# Patient Record
Sex: FEMALE | Race: WHITE | NOT HISPANIC OR LATINO | URBAN - METROPOLITAN AREA
[De-identification: names, ages, dates, MRNs, and addresses within clinical notes are randomized per-mention and may not be internally consistent; named-entity substitution may affect disease eponyms.]

---

## 2021-09-27 ENCOUNTER — OFFICE VISIT (OUTPATIENT)
Dept: OBSTETRICS AND GYNECOLOGY | Facility: CLINIC | Age: 21
End: 2021-09-27

## 2021-09-27 VITALS
HEIGHT: 72 IN | DIASTOLIC BLOOD PRESSURE: 74 MMHG | WEIGHT: 289 LBS | BODY MASS INDEX: 39.14 KG/M2 | SYSTOLIC BLOOD PRESSURE: 115 MMHG

## 2021-09-27 DIAGNOSIS — F31.9 BIPOLAR 1 DISORDER (HCC): ICD-10-CM

## 2021-09-27 DIAGNOSIS — Z11.3 SCREENING EXAMINATION FOR STD (SEXUALLY TRANSMITTED DISEASE): Primary | ICD-10-CM

## 2021-09-27 DIAGNOSIS — Z30.09 GENERAL COUNSELING AND ADVICE FOR CONTRACEPTIVE MANAGEMENT: ICD-10-CM

## 2021-09-27 DIAGNOSIS — B00.9 HERPES: ICD-10-CM

## 2021-09-27 PROCEDURE — 99203 OFFICE O/P NEW LOW 30 MIN: CPT | Performed by: OBSTETRICS & GYNECOLOGY

## 2021-09-27 RX ORDER — DROSPIRENONE 4 MG/1
1 TABLET, FILM COATED ORAL DAILY
Qty: 28 TABLET | Refills: 11 | Status: SHIPPED | OUTPATIENT
Start: 2021-09-27 | End: 2022-04-13 | Stop reason: SDUPTHER

## 2021-09-27 RX ORDER — VENLAFAXINE 25 MG/1
25 TABLET ORAL DAILY
COMMUNITY

## 2021-09-27 RX ORDER — ACYCLOVIR 400 MG/1
400 TABLET ORAL
COMMUNITY
End: 2021-09-27

## 2021-09-27 RX ORDER — VALACYCLOVIR HYDROCHLORIDE 500 MG/1
500 TABLET, FILM COATED ORAL DAILY
Qty: 30 TABLET | Refills: 11
Start: 2021-09-27 | End: 2022-09-27

## 2021-09-29 LAB
C TRACH RRNA SPEC QL NAA+PROBE: NEGATIVE
N GONORRHOEA RRNA SPEC QL NAA+PROBE: NEGATIVE

## 2022-03-14 ENCOUNTER — TELEPHONE (OUTPATIENT)
Dept: OBSTETRICS AND GYNECOLOGY | Facility: CLINIC | Age: 22
End: 2022-03-14

## 2022-04-13 ENCOUNTER — OFFICE VISIT (OUTPATIENT)
Dept: OBSTETRICS AND GYNECOLOGY | Facility: CLINIC | Age: 22
End: 2022-04-13

## 2022-04-13 VITALS
BODY MASS INDEX: 39.39 KG/M2 | WEIGHT: 290.8 LBS | SYSTOLIC BLOOD PRESSURE: 122 MMHG | HEIGHT: 72 IN | DIASTOLIC BLOOD PRESSURE: 62 MMHG

## 2022-04-13 DIAGNOSIS — E66.9 OBESITY (BMI 30-39.9): ICD-10-CM

## 2022-04-13 DIAGNOSIS — Z11.3 SCREENING FOR STDS (SEXUALLY TRANSMITTED DISEASES): ICD-10-CM

## 2022-04-13 DIAGNOSIS — Z01.419 WOMEN'S ANNUAL ROUTINE GYNECOLOGICAL EXAMINATION: Primary | ICD-10-CM

## 2022-04-13 DIAGNOSIS — Z30.011 ENCOUNTER FOR INITIAL PRESCRIPTION OF CONTRACEPTIVE PILLS: ICD-10-CM

## 2022-04-13 PROCEDURE — 99395 PREV VISIT EST AGE 18-39: CPT | Performed by: NURSE PRACTITIONER

## 2022-04-13 RX ORDER — DROSPIRENONE 4 MG/1
1 TABLET, FILM COATED ORAL DAILY
Qty: 84 TABLET | Refills: 3 | Status: SHIPPED | OUTPATIENT
Start: 2022-04-13 | End: 2023-04-13

## 2022-04-26 DIAGNOSIS — Z01.419 WOMEN'S ANNUAL ROUTINE GYNECOLOGICAL EXAMINATION: ICD-10-CM

## 2025-03-27 ENCOUNTER — TELEPHONE (OUTPATIENT)
Dept: OBSTETRICS AND GYNECOLOGY | Facility: CLINIC | Age: 25
End: 2025-03-27
Payer: COMMERCIAL

## 2025-03-27 NOTE — TELEPHONE ENCOUNTER
Pt called and stated she has been bleeding for 17 days straight. Pt [place] further away and was seen by previous gyn doctor and was told she needed a transvaginal ultrasound. First available they can get her in for u/s would be may 2nd and she was seeing if we could get her in sooner.

## 2025-03-27 NOTE — TELEPHONE ENCOUNTER
Called patient she states she say a gyn today in [place] but they wouldn't do an ultrasound until may and she has been bleeding every day for 17 days and it is increasing. Nexplanon in place explained to patient that the med they gave her would probably help but she insist on having an us. Has seen Dr. Fields in the past added on us for tomorrow and to see him.

## 2025-03-28 ENCOUNTER — OFFICE VISIT (OUTPATIENT)
Dept: OBSTETRICS AND GYNECOLOGY | Facility: CLINIC | Age: 25
End: 2025-03-28
Payer: COMMERCIAL

## 2025-03-28 VITALS
BODY MASS INDEX: 29.53 KG/M2 | HEIGHT: 72 IN | WEIGHT: 218 LBS | SYSTOLIC BLOOD PRESSURE: 118 MMHG | DIASTOLIC BLOOD PRESSURE: 78 MMHG

## 2025-03-28 DIAGNOSIS — Z01.419 PAP TEST, AS PART OF ROUTINE GYNECOLOGICAL EXAMINATION: ICD-10-CM

## 2025-03-28 DIAGNOSIS — Z97.5 NEXPLANON IN PLACE: ICD-10-CM

## 2025-03-28 DIAGNOSIS — N92.1 MENORRHAGIA WITH IRREGULAR CYCLE: Primary | ICD-10-CM

## 2025-03-28 DIAGNOSIS — N93.9 ABNORMAL UTERINE BLEEDING (AUB): ICD-10-CM

## 2025-03-28 RX ORDER — TIRZEPATIDE 2.5 MG/.5ML
2.5 INJECTION, SOLUTION SUBCUTANEOUS WEEKLY
COMMUNITY

## 2025-03-28 RX ORDER — PRENATAL VIT NO.126/IRON/FOLIC 28MG-0.8MG
1 TABLET ORAL DAILY
COMMUNITY

## 2025-03-28 NOTE — PROGRESS NOTES
Chief Complaint   Patient presents with    Follow-up         Subjective   HPI  Dar Rodriguez is a 24 y.o. female, , Patient's last menstrual period was 03/10/2025.. She presents for evaluation of bleeding for 18 continuous days. She has a nexplanon in place that was place 2023. She was seen by her Gyn office yesterday and was given norethindrone. The provider wanted her to have an ultrasound but there was no availability at their clinic. She is here to today for ultrasound.     US was done today.     Thromboembolic Disease: none  History of hypertension: no  History of migraines: no  Tobacco Usage?: No     Additional OB/GYN History   Last Pap : 23 ASCUS, HPV + (she is scheduled for annual with her regular GYN next month)  Last Completed Pap Smear            Upcoming       PAP SMEAR (Every 3 Years) Next due on 2022  Order placed for Pap IG, Ct-Ng, Rfx HPV ASCU by Alexsander Julian, MA    2022  SCANNED - PAP SMEAR                              Current Outpatient Medications:     NON FORMULARY, Norerthidrone 5 mg, Disp: , Rfl:     norethindrone-ethinyl estradiol (Necon 1/35, 28,) 1-35 MG-MCG per tablet, Take 1 tablet by mouth Daily., Disp: 28 tablet, Rfl: 4    prenatal vitamin (prenatal, CLASSIC, vitamin) tablet, Take 1 tablet by mouth Daily., Disp: , Rfl:     Tirzepatide-Weight Management (Zepbound) 2.5 MG/0.5ML solution, Inject 0.5 mL under the skin into the appropriate area as directed 1 (One) Time Per Week., Disp: , Rfl:     Vitamin D-Vitamin K (D3 + K2 PO), Take  by mouth., Disp: , Rfl:     venlafaxine (EFFEXOR) 25 MG tablet, Take 25 mg by mouth Daily. (Patient not taking: Reported on 3/28/2025), Disp: , Rfl:      Past Medical History:   Diagnosis Date    Bipolar 1 disorder     Herpes         Past Surgical History:   Procedure Laterality Date    TONSILLECTOMY AND ADENOIDECTOMY           The additional following portions of the patient's history were reviewed and  "updated as appropriate: allergies, current medications, past family history, past medical history, past social history, past surgical history, and problem list.    Review of Systems   Constitutional: Negative.    HENT: Negative.     Eyes: Negative.    Respiratory: Negative.     Cardiovascular: Negative.    Gastrointestinal: Negative.    Endocrine: Negative.    Genitourinary:  Positive for menstrual problem. Negative for pelvic pain and pelvic pressure.   Musculoskeletal: Negative.    Skin: Negative.    Allergic/Immunologic: Negative.    Neurological: Negative.    Hematological: Negative.    Psychiatric/Behavioral: Negative.         I have reviewed and agree with the HPI, ROS, and historical information as entered above.   Carlos Fields MD      Objective   /78   Ht 182.9 cm (72\")   Wt 98.9 kg (218 lb)   LMP 03/10/2025   Breastfeeding No   BMI 29.57 kg/m²     Physical Exam  Vitals and nursing note reviewed.   Constitutional:       Appearance: Normal appearance. She is obese.   HENT:      Head: Normocephalic.   Pulmonary:      Effort: Pulmonary effort is normal.   Neurological:      Mental Status: She is alert and oriented to person, place, and time.   Psychiatric:         Behavior: Behavior normal.         Assessment & Plan     Assessment     Problem List Items Addressed This Visit          Gynecologic and Obstetric Problems    Abnormal uterine bleeding (AUB)    Overview   3/28/2025 ultrasound was negative.  Likely breakthrough bleeding on Nexplanon.  Can try regulating with 2 cycles of oral contraceptives.            Other    Nexplanon in place    Overview   Nexplanon insertion at outside clinic February 2023          Other Visit Diagnoses         Menorrhagia with irregular cycle    -  Primary    Relevant Orders    US Non-ob Transvaginal (Completed)      Pap test, as part of routine gynecological examination                  Plan     Nexplanon insertion in [place] February 2023.  States was " having light to normal menses every 2 to 3 months.  Onset of normal bleeding on 3/10/2025 followed by prolonged heavy bleeding which has persisted.  Given Rx of norethindrone 5 mg by gynecologist decreasing bleeding.  Ultrasound today was negative for fibroids or polyps.  Probable breakthrough bleeding on Nexplanon.  Should improve after 2 cycles of oral contraceptives.  Rx for Necon 1/35 sent to pharmacy.  Call for any worsening or persistent bleeding.      Carlos Fields MD  03/28/2025